# Patient Record
Sex: FEMALE | NOT HISPANIC OR LATINO | URBAN - METROPOLITAN AREA
[De-identification: names, ages, dates, MRNs, and addresses within clinical notes are randomized per-mention and may not be internally consistent; named-entity substitution may affect disease eponyms.]

---

## 2021-11-14 ENCOUNTER — EMERGENCY (EMERGENCY)
Facility: HOSPITAL | Age: 26
LOS: 1 days | Discharge: HOME | End: 2021-11-14
Attending: EMERGENCY MEDICINE
Payer: MEDICAID

## 2021-11-14 VITALS
RESPIRATION RATE: 18 BRPM | HEART RATE: 74 BPM | DIASTOLIC BLOOD PRESSURE: 65 MMHG | OXYGEN SATURATION: 95 % | SYSTOLIC BLOOD PRESSURE: 102 MMHG

## 2021-11-14 VITALS
HEART RATE: 68 BPM | DIASTOLIC BLOOD PRESSURE: 67 MMHG | HEIGHT: 63 IN | TEMPERATURE: 98 F | WEIGHT: 130.07 LBS | SYSTOLIC BLOOD PRESSURE: 95 MMHG | RESPIRATION RATE: 18 BRPM | OXYGEN SATURATION: 99 %

## 2021-11-14 LAB
ALBUMIN SERPL ELPH-MCNC: 4.6 G/DL — SIGNIFICANT CHANGE UP (ref 3.3–5)
ALP SERPL-CCNC: 121 U/L — HIGH (ref 40–120)
ALT FLD-CCNC: 410 U/L — HIGH (ref 10–45)
ANION GAP SERPL CALC-SCNC: 13 MMOL/L — SIGNIFICANT CHANGE UP (ref 5–17)
APPEARANCE UR: CLEAR — SIGNIFICANT CHANGE UP
AST SERPL-CCNC: 251 U/L — HIGH (ref 10–40)
BACTERIA # UR AUTO: NEGATIVE — SIGNIFICANT CHANGE UP
BASE EXCESS BLDV CALC-SCNC: 3.2 MMOL/L — HIGH (ref -2–2)
BASOPHILS # BLD AUTO: 0.01 K/UL — SIGNIFICANT CHANGE UP (ref 0–0.2)
BASOPHILS NFR BLD AUTO: 0.2 % — SIGNIFICANT CHANGE UP (ref 0–2)
BILIRUB SERPL-MCNC: 0.8 MG/DL — SIGNIFICANT CHANGE UP (ref 0.2–1.2)
BILIRUB UR-MCNC: NEGATIVE — SIGNIFICANT CHANGE UP
BUN SERPL-MCNC: 11 MG/DL — SIGNIFICANT CHANGE UP (ref 7–23)
CA-I SERPL-SCNC: 1.2 MMOL/L — SIGNIFICANT CHANGE UP (ref 1.15–1.33)
CALCIUM SERPL-MCNC: 8.9 MG/DL — SIGNIFICANT CHANGE UP (ref 8.4–10.5)
CHLORIDE BLDV-SCNC: 102 MMOL/L — SIGNIFICANT CHANGE UP (ref 96–108)
CHLORIDE SERPL-SCNC: 103 MMOL/L — SIGNIFICANT CHANGE UP (ref 96–108)
CO2 BLDV-SCNC: 31 MMOL/L — HIGH (ref 22–26)
CO2 SERPL-SCNC: 22 MMOL/L — SIGNIFICANT CHANGE UP (ref 22–31)
COLOR SPEC: SIGNIFICANT CHANGE UP
CREAT SERPL-MCNC: 0.46 MG/DL — LOW (ref 0.5–1.3)
DIFF PNL FLD: ABNORMAL
EOSINOPHIL # BLD AUTO: 0.22 K/UL — SIGNIFICANT CHANGE UP (ref 0–0.5)
EOSINOPHIL NFR BLD AUTO: 4.9 % — SIGNIFICANT CHANGE UP (ref 0–6)
EPI CELLS # UR: 3 /HPF — SIGNIFICANT CHANGE UP
GAS PNL BLDV: 138 MMOL/L — SIGNIFICANT CHANGE UP (ref 136–145)
GAS PNL BLDV: SIGNIFICANT CHANGE UP
GAS PNL BLDV: SIGNIFICANT CHANGE UP
GLUCOSE BLDV-MCNC: 90 MG/DL — SIGNIFICANT CHANGE UP (ref 70–99)
GLUCOSE SERPL-MCNC: 90 MG/DL — SIGNIFICANT CHANGE UP (ref 70–99)
GLUCOSE UR QL: NEGATIVE — SIGNIFICANT CHANGE UP
HCG UR QL: NEGATIVE — SIGNIFICANT CHANGE UP
HCO3 BLDV-SCNC: 30 MMOL/L — HIGH (ref 22–29)
HCT VFR BLD CALC: 39 % — SIGNIFICANT CHANGE UP (ref 34.5–45)
HCT VFR BLDA CALC: 41 % — SIGNIFICANT CHANGE UP (ref 34.5–46.5)
HGB BLD CALC-MCNC: 13.5 G/DL — SIGNIFICANT CHANGE UP (ref 11.7–16.1)
HGB BLD-MCNC: 12.9 G/DL — SIGNIFICANT CHANGE UP (ref 11.5–15.5)
HYALINE CASTS # UR AUTO: 0 /LPF — SIGNIFICANT CHANGE UP (ref 0–2)
IMM GRANULOCYTES NFR BLD AUTO: 0.2 % — SIGNIFICANT CHANGE UP (ref 0–1.5)
KETONES UR-MCNC: NEGATIVE — SIGNIFICANT CHANGE UP
LACTATE BLDV-MCNC: 1.1 MMOL/L — SIGNIFICANT CHANGE UP (ref 0.7–2)
LEUKOCYTE ESTERASE UR-ACNC: NEGATIVE — SIGNIFICANT CHANGE UP
LIDOCAIN IGE QN: 74 U/L — HIGH (ref 7–60)
LYMPHOCYTES # BLD AUTO: 1.4 K/UL — SIGNIFICANT CHANGE UP (ref 1–3.3)
LYMPHOCYTES # BLD AUTO: 31 % — SIGNIFICANT CHANGE UP (ref 13–44)
MCHC RBC-ENTMCNC: 30.8 PG — SIGNIFICANT CHANGE UP (ref 27–34)
MCHC RBC-ENTMCNC: 33.1 GM/DL — SIGNIFICANT CHANGE UP (ref 32–36)
MCV RBC AUTO: 93.1 FL — SIGNIFICANT CHANGE UP (ref 80–100)
MONOCYTES # BLD AUTO: 0.4 K/UL — SIGNIFICANT CHANGE UP (ref 0–0.9)
MONOCYTES NFR BLD AUTO: 8.8 % — SIGNIFICANT CHANGE UP (ref 2–14)
NEUTROPHILS # BLD AUTO: 2.48 K/UL — SIGNIFICANT CHANGE UP (ref 1.8–7.4)
NEUTROPHILS NFR BLD AUTO: 54.9 % — SIGNIFICANT CHANGE UP (ref 43–77)
NITRITE UR-MCNC: NEGATIVE — SIGNIFICANT CHANGE UP
NRBC # BLD: 0 /100 WBCS — SIGNIFICANT CHANGE UP (ref 0–0)
PCO2 BLDV: 51 MMHG — HIGH (ref 39–42)
PH BLDV: 7.37 — SIGNIFICANT CHANGE UP (ref 7.32–7.43)
PH UR: 6.5 — SIGNIFICANT CHANGE UP (ref 5–8)
PLATELET # BLD AUTO: 207 K/UL — SIGNIFICANT CHANGE UP (ref 150–400)
PO2 BLDV: 25 MMHG — SIGNIFICANT CHANGE UP (ref 25–45)
POTASSIUM BLDV-SCNC: 4 MMOL/L — SIGNIFICANT CHANGE UP (ref 3.5–5.1)
POTASSIUM SERPL-MCNC: 3.8 MMOL/L — SIGNIFICANT CHANGE UP (ref 3.5–5.3)
POTASSIUM SERPL-SCNC: 3.8 MMOL/L — SIGNIFICANT CHANGE UP (ref 3.5–5.3)
PROT SERPL-MCNC: 7.4 G/DL — SIGNIFICANT CHANGE UP (ref 6–8.3)
PROT UR-MCNC: NEGATIVE — SIGNIFICANT CHANGE UP
RBC # BLD: 4.19 M/UL — SIGNIFICANT CHANGE UP (ref 3.8–5.2)
RBC # FLD: 12.1 % — SIGNIFICANT CHANGE UP (ref 10.3–14.5)
RBC CASTS # UR COMP ASSIST: 1 /HPF — SIGNIFICANT CHANGE UP (ref 0–4)
SAO2 % BLDV: 35.2 % — LOW (ref 67–88)
SODIUM SERPL-SCNC: 138 MMOL/L — SIGNIFICANT CHANGE UP (ref 135–145)
SP GR SPEC: 1.02 — SIGNIFICANT CHANGE UP (ref 1.01–1.02)
UROBILINOGEN FLD QL: NEGATIVE — SIGNIFICANT CHANGE UP
WBC # BLD: 4.52 K/UL — SIGNIFICANT CHANGE UP (ref 3.8–10.5)
WBC # FLD AUTO: 4.52 K/UL — SIGNIFICANT CHANGE UP (ref 3.8–10.5)
WBC UR QL: 4 /HPF — SIGNIFICANT CHANGE UP (ref 0–5)

## 2021-11-14 PROCEDURE — 82803 BLOOD GASES ANY COMBINATION: CPT

## 2021-11-14 PROCEDURE — 85018 HEMOGLOBIN: CPT

## 2021-11-14 PROCEDURE — 99284 EMERGENCY DEPT VISIT MOD MDM: CPT | Mod: 25

## 2021-11-14 PROCEDURE — 85014 HEMATOCRIT: CPT

## 2021-11-14 PROCEDURE — 76705 ECHO EXAM OF ABDOMEN: CPT

## 2021-11-14 PROCEDURE — 82330 ASSAY OF CALCIUM: CPT

## 2021-11-14 PROCEDURE — 82435 ASSAY OF BLOOD CHLORIDE: CPT

## 2021-11-14 PROCEDURE — 81001 URINALYSIS AUTO W/SCOPE: CPT

## 2021-11-14 PROCEDURE — 99282 EMERGENCY DEPT VISIT SF MDM: CPT

## 2021-11-14 PROCEDURE — 80053 COMPREHEN METABOLIC PANEL: CPT

## 2021-11-14 PROCEDURE — 84295 ASSAY OF SERUM SODIUM: CPT

## 2021-11-14 PROCEDURE — 81025 URINE PREGNANCY TEST: CPT

## 2021-11-14 PROCEDURE — 76705 ECHO EXAM OF ABDOMEN: CPT | Mod: 26,RT

## 2021-11-14 PROCEDURE — 85025 COMPLETE CBC W/AUTO DIFF WBC: CPT

## 2021-11-14 PROCEDURE — 83605 ASSAY OF LACTIC ACID: CPT

## 2021-11-14 PROCEDURE — 83690 ASSAY OF LIPASE: CPT

## 2021-11-14 PROCEDURE — 82947 ASSAY GLUCOSE BLOOD QUANT: CPT

## 2021-11-14 PROCEDURE — 99285 EMERGENCY DEPT VISIT HI MDM: CPT

## 2021-11-14 PROCEDURE — 84132 ASSAY OF SERUM POTASSIUM: CPT

## 2021-11-14 RX ORDER — SODIUM CHLORIDE 9 MG/ML
1000 INJECTION INTRAMUSCULAR; INTRAVENOUS; SUBCUTANEOUS ONCE
Refills: 0 | Status: COMPLETED | OUTPATIENT
Start: 2021-11-14 | End: 2021-11-14

## 2021-11-14 RX ADMIN — SODIUM CHLORIDE 1000 MILLILITER(S): 9 INJECTION INTRAMUSCULAR; INTRAVENOUS; SUBCUTANEOUS at 14:14

## 2021-11-14 NOTE — ED PROVIDER NOTE - NSFOLLOWUPINSTRUCTIONS_ED_ALL_ED_FT
Please see the information of Gallstones.    Take Ibuprofen for pain with food as package directed and respect warnings on the label.    Avoid taking Tylenol because of elevated liver function test.    Follow up with surgeon Dr. Taylor and GI clinic, [phone], call Monday for appointment.    Return for any concerns or worsening symptoms.                                                                                                              Gallstones    WHAT YOU NEED TO KNOW:    Gallstones are hard substances that form in your gallbladder or bile duct. Your gallbladder and bile duct are located on the right side of your abdomen, near your liver. Your gallbladder stores bile. Bile helps break down the fat that you eat. Your gallbladder also helps remove certain chemicals from your body.    Gallstones         DISCHARGE INSTRUCTIONS:    Return to the emergency department if:   •You have a fever and chills.      •Your skin or eyes turn yellow.      •You have severe pain in your upper abdomen, just below the right ribcage.      Contact your healthcare provider if:   •You have nausea and are vomiting.      •Your urine is dark.      •You have cristiana-colored bowel movements.      •You have questions or concerns about your condition or care.      Medicines:   •Prescription pain medicine may be given. Ask your healthcare provider how to take this medicine safely.      •Take your medicine as directed. Contact your healthcare provider if you think your medicine is not helping or if you have side effects. Tell him or her if you are allergic to any medicine. Keep a list of the medicines, vitamins, and herbs you take. Include the amounts, and when and why you take them. Bring the list or the pill bottles to follow-up visits. Carry your medicine list with you in case of an emergency.      What you can do to manage or prevent gallstones:   •Eat a variety of healthy foods. This may help you have more energy and heal faster. Healthy foods include fruits, vegetables, whole-grain breads, low-fat dairy products, beans, lean meat, and fish. Ask if you need to be on a special diet. Try to eat regular meals during the day. This will help your gallbladder empty.      •Exercise as directed. Talk to your healthcare provider about the best exercise plan for you. Exercise can help you lose weight and improve your health.      •Manage your weight. If you are overweight, it is important to reach a healthy weight. You will need to lose weight slowly because rapid weight loss can increase your risk for gallstones. Talk to your healthcare provider about your weight. He or she can help you create a safe weight loss plan if you need to lose weight.      Follow up with your healthcare provider as directed: Write down your questions so you remember to ask them during your visits. Please see the information of Gallstones.    Take Ibuprofen for pain with food as package directed and respect warnings on the label.    Avoid taking Tylenol because of elevated liver function test.    Follow up with surgeon Dr. Taylor and GI clinic, [phone], call Monday for appointment.    Follow up with your primary Dr. for reevaluation, GI referral, and repeat LFT (lever function test), call Monday for appointment.     Return for any concerns or worsening symptoms.                                                                                                              Gallstones    WHAT YOU NEED TO KNOW:    Gallstones are hard substances that form in your gallbladder or bile duct. Your gallbladder and bile duct are located on the right side of your abdomen, near your liver. Your gallbladder stores bile. Bile helps break down the fat that you eat. Your gallbladder also helps remove certain chemicals from your body.    Gallstones         DISCHARGE INSTRUCTIONS:    Return to the emergency department if:   •You have a fever and chills.      •Your skin or eyes turn yellow.      •You have severe pain in your upper abdomen, just below the right ribcage.      Contact your healthcare provider if:   •You have nausea and are vomiting.      •Your urine is dark.      •You have cristiana-colored bowel movements.      •You have questions or concerns about your condition or care.      Medicines:   •Prescription pain medicine may be given. Ask your healthcare provider how to take this medicine safely.      •Take your medicine as directed. Contact your healthcare provider if you think your medicine is not helping or if you have side effects. Tell him or her if you are allergic to any medicine. Keep a list of the medicines, vitamins, and herbs you take. Include the amounts, and when and why you take them. Bring the list or the pill bottles to follow-up visits. Carry your medicine list with you in case of an emergency.      What you can do to manage or prevent gallstones:   •Eat a variety of healthy foods. This may help you have more energy and heal faster. Healthy foods include fruits, vegetables, whole-grain breads, low-fat dairy products, beans, lean meat, and fish. Ask if you need to be on a special diet. Try to eat regular meals during the day. This will help your gallbladder empty.      •Exercise as directed. Talk to your healthcare provider about the best exercise plan for you. Exercise can help you lose weight and improve your health.      •Manage your weight. If you are overweight, it is important to reach a healthy weight. You will need to lose weight slowly because rapid weight loss can increase your risk for gallstones. Talk to your healthcare provider about your weight. He or she can help you create a safe weight loss plan if you need to lose weight.      Follow up with your healthcare provider as directed: Write down your questions so you remember to ask them during your visits.

## 2021-11-14 NOTE — ED PROVIDER NOTE - CARE PROVIDER_API CALL
Castro Mcwilliams)  Surgery; Surgical Critical Care  [address] [facility], [facility] [address]  [place] [address]  Phone: [phone]  Fax: [phone]  Follow Up Time:

## 2021-11-14 NOTE — ED ADULT NURSE NOTE - OBJECTIVE STATEMENT
ID: [id number]:: 26 y F presents to the ED with abdominal pain. reports that she has been seen in 2 other EDs and was told she had gallstones and they were recommending surgery. Pt wants to speak to surgical team here because she wants to know other options. reports that she was having RUQ abdominal pain and nausea but denies symptoms at this time. On assessment, A&Ox4. Denies lightheadedness, dizziness, headaches, numbness and tingling. Breathing spontaneously and unlabored on Room air. Denies cough, SOB and CP. No Peripheral edema. Peripheral pulses strong and equal bilaterally. Denies CP, SOB and palpitations. Abdomen soft, nontender, nondistended, negative CVA tenderness, positive bowel sounds in all four quadrants. Pt is continent. Denies n/v/d, dysuria, melena and hematuria. IV placed 20g in RAC. Labs drawn and sent. Pt safety maintained. Call bell within reach. Side rails in upward position. Pt awaiting dispo.

## 2021-11-14 NOTE — ED PROVIDER NOTE - PROGRESS NOTE DETAILS
Pt went to US. Endorsed to Dr DOMINGO Mac MD, Facep Pt was informed of lab result including elevated LFT/Amylase. Pt was informed of lab result including elevated LFT/Amylase ([id number], April) and awaiting for US reading. awaiting for surgery consult now. NAD. Asymptomatic. Informed of US result. Surgery at bedside. Dr. Boudreaux:  Seen by surgery. No acute surgical intervention. Stable for DC with OP GI and surgery FU. Dr. Boudreaux:  Seen by surgery. No acute surgical intervention. Pt refused surgery at this time and recommended to outpt f/u with Dr. Brantley. Stable for DC with OP GI and surgery FU.

## 2021-11-14 NOTE — ED PROVIDER NOTE - OBJECTIVE STATEMENT
Using Mandarin Interpret Service ([id number]); 25yo female pt, no PMHx, no PSHx presents to ED with gallstone and 2nd opinion for surgery. Pt stated she's had RUQ pain Using Mandarin Interpret Service ([id number]); 25yo female pt, no PMHx, no PSHx presents to ED with gallstone and 2nd opinion for surgery. Pt stated she's had RUQ pain and went to two different ED ([facility]). She was told 'gallstone' and recommended surgery. She denies any pain or discomfort today but concerns of surgery. She ate breakfast this morning without discomfort. Denies fever, chills, cough or congestion. Denies N/V/D.

## 2021-11-14 NOTE — CONSULT NOTE ADULT - ASSESSMENT
26 year old female previously healthy presents with 2 days of intermittent abdominal pain 2/2 to symptomatic cholelithiasis     PLAN:    - No surgical intervention since pain completely resolved.  - Follow up with Dr. Taylor as outpatient for interval cholelithiasis   - Plan discussed with Attending, Dr. Claudia Tejada MD, PGY 3  Trauma and Acute Care Surgery  [id number]   26 year old female previously healthy presents with 2 days of intermittent abdominal pain 2/2 to symptomatic cholelithiasis     PLAN:    - No surgical intervention since pain completely resolved.  - Follow up with Dr. Taylor as outpatient for interval cholecystectomy  - Plan discussed with Attending, Dr. Claudia Tejada MD, PGY 3  Trauma and Acute Care Surgery  [id number]

## 2021-11-14 NOTE — ED PROVIDER NOTE - PATIENT PORTAL LINK FT
You can access the FollowMyHealth Patient Portal offered by [facility] by registering at the following website: [email or web address]. By joining [facility]’s FollowMyHealth portal, you will also be able to view your health information using other applications (apps) compatible with our system.

## 2021-11-14 NOTE — CONSULT NOTE ADULT - SUBJECTIVE AND OBJECTIVE BOX
GENERAL SURGERY CONSULT NOTE  --------------------------------------------------------------------------------------------  HPI:  26 year old female previously healthy presents with 2 days of intermittent abdominal pain. Patient reports 2 days ago she had abdominal pain and presented to [facility], she was given morphine and discharged when her symptoms improved. Yesterday she was at work and again started to have abdominal pain causing her to present to [facility] and was found to have symptomatic cholelithiases and was offered cholecystectomy. Patient declined and presented here for another opinion. Patient reports pain completely improved, denies nausea, vomiting, fever, chills.         PAST MEDICAL & SURGICAL HISTORY:  2019 novel coronavirus disease (COVID-19)  [employer]    No significant past surgical history      FAMILY HISTORY:  No family history of gallbladder pathology     SOCIAL HISTORY:      ALLERGIES: No Known Allergies    HOME MEDICATIONS:     CURRENT MEDICATIONS  MEDICATIONS (STANDING):   MEDICATIONS (PRN):  --------------------------------------------------------------------------------------------    Vitals:   T(C): 36.8 (21 @ 16:17), Max: 36.8 (21 @ 14:18)  HR: 74 (21 @ 17:47) (58 - 74)  BP: 102/65 (21 @ 17:47) (95/59 - 105/71)  RR: 18 (21 @ 17:47) (18 - 18)  SpO2: 95% (21 @ 17:47) (95% - 99%)  CAPILLARY BLOOD GLUCOSE          Height (cm): 160 ( @ 11:56)  Weight (kg): 59 ( @ 11:56)  BMI (kg/m2): 23 ( @ 11:56)  BSA (m2): 1.61 ( @ 11:56)      PHYSICAL EXAM:   General: NAD, Lying in bed comfortably  Neuro: Alert and answering questions appropriately   HEENT: Grossly normal, EOMI  Cardio: No peripherial edema  Resp: Good effort, no signs of respiratory distress  GI/Abd: Soft, NT/ND, no rebound/guarding, no masses palpated  Vascular: All 4 extremities warm  Musculoskeletal: All 4 extremities moving spontaneously, no limitations  --------------------------------------------------------------------------------------------    LABS  CBC ( 14:18)                              12.9                           4.52    )----------------(  207        54.9  % Neutrophils, 31.0  % Lymphocytes, ANC: 2.48                                39.0      BMP ( @ 14:18)             138     |  103     |  11    		Ca++ --      Ca 8.9                ---------------------------------( 90    		Mg --                 3.8     |  22      |  0.46<L>			Ph --        LFTs ( @ 14:18)      TPro 7.4 / Alb 4.6 / TBili 0.8 / DBili -- / <H> / <H> / AlkPhos 121<H>          VBG ( @ 14:18)     7.37 / 51<H> / 25 / 30<H> / 3.2<H> / 35.2<L>%     Lactate: 1.1    --------------------------------------------------------------------------------------------    MICROBIOLOGY  Urinalysis ( @ 14:18):     Color: Light Yellow / Appearance: Clear / S.017 / pH: 6.5 / Gluc: Negative / Ketones: Negative / Bili: Negative / Urobili: Negative / Protein :Negative / Nitrites: Negative / Leuk.Est: Negative / RBC: 1 / WBC: 4 / Sq Epi:  / Non Sq Epi: 3 / Bacteria Negative         --------------------------------------------------------------------------------------------    IMAGING  < from: US Abdomen Upper Quadrant Right (21 @ 15:53) >    IMPRESSION:    Small mobile gallstones without sonographic evidence of acute cholecystitis.        --- End of Report ---    < end of copied text >      --------------------------------------------------------------------------------------------

## 2021-11-14 NOTE — ED PROVIDER NOTE - CLINICAL SUMMARY MEDICAL DECISION MAKING FREE TEXT BOX
Young adult recent dx as gallstones pw abd pain. Last yesterday for second opinion. Normal abd exam. check labs sono, Reassess  Jonn Mac MD, Facep

## 2021-11-14 NOTE — ED PROVIDER NOTE - ATTENDING CONTRIBUTION TO CARE
Private Physician Raúl Tejada PCP/AGUILA,    Mandnoemi [id number] JOE  26y female pmh No dm,htn,hld,habits,current pregnancy, prior surg. Pt comes to ed c/o abd pain onset several days ago seen at OSH/ED, Dx as Gallstones. Pt told needed surgery. Pt declined. Now pw c/o pain yesterday. None today. No nvdc, fever and chills,cp,sob,cough,dysuria, Private Physician Raúl Tejada PCP/AGUILA,    Radha [id number] JOE  26y female pmh No dm,htn,hld,habits,current pregnancy, prior surg. Pt comes to ed c/o abd pain onset several days ago seen at OSH/ED, Dx as Gallstones. Pt told needed surgery. Pt declined. Now pw c/o pain yesterday. None today. No nvdc, fever and chills,cp,sob,cough,dysuria,anorexia. PT had usual breakfast of cereal without pain. Last pain meds yesterday. PE WDWN female awake alert normocephalic atraumatic neck supple chest clear anterior & posterior cv no rubs, gallops or murmurs abd soft +bs no mass guarding cvat, neruo no focal defects  Jonn Mac MD, Facep

## 2023-10-21 NOTE — ED PROVIDER NOTE - NSFOLLOWUPCLINICS_GEN_ALL_ED_FT
No Gastroenterology at [facility]  Gastroenterology  [address] [place] [id number]  Phone: [phone]  Fax:

## 2024-09-12 NOTE — ED PROVIDER NOTE - DATE/TIME 4
Detail Level: Detailed
Quality 130: Documentation Of Current Medications In The Medical Record: Current Medications Documented
14-Nov-2021 16:53